# Patient Record
Sex: MALE | Race: WHITE | NOT HISPANIC OR LATINO | Employment: FULL TIME | URBAN - NONMETROPOLITAN AREA
[De-identification: names, ages, dates, MRNs, and addresses within clinical notes are randomized per-mention and may not be internally consistent; named-entity substitution may affect disease eponyms.]

---

## 2020-05-28 ENCOUNTER — OFFICE VISIT (OUTPATIENT)
Dept: SURGERY | Facility: CLINIC | Age: 45
End: 2020-05-28

## 2020-05-28 VITALS
HEIGHT: 70 IN | HEART RATE: 77 BPM | SYSTOLIC BLOOD PRESSURE: 117 MMHG | BODY MASS INDEX: 32.21 KG/M2 | WEIGHT: 225 LBS | DIASTOLIC BLOOD PRESSURE: 80 MMHG

## 2020-05-28 DIAGNOSIS — L02.214 CUTANEOUS ABSCESS OF GROIN: Primary | ICD-10-CM

## 2020-05-28 PROCEDURE — 99202 OFFICE O/P NEW SF 15 MIN: CPT | Performed by: SURGERY

## 2020-05-28 RX ORDER — LISINOPRIL 40 MG/1
TABLET ORAL
COMMUNITY
Start: 2020-03-10

## 2020-05-28 RX ORDER — ROPINIROLE 0.25 MG/1
TABLET, FILM COATED ORAL
COMMUNITY
Start: 2015-06-25 | End: 2021-03-16

## 2020-05-28 RX ORDER — INSULIN DEGLUDEC 200 U/ML
INJECTION, SOLUTION SUBCUTANEOUS
COMMUNITY
Start: 2020-05-27

## 2020-05-28 RX ORDER — GABAPENTIN 300 MG/1
CAPSULE ORAL
COMMUNITY
Start: 2015-09-21 | End: 2021-03-16

## 2020-05-28 RX ORDER — ALLOPURINOL 100 MG/1
TABLET ORAL
COMMUNITY
Start: 2020-05-10

## 2020-05-28 RX ORDER — SULFAMETHOXAZOLE AND TRIMETHOPRIM 800; 160 MG/1; MG/1
TABLET ORAL
COMMUNITY
Start: 2020-05-26 | End: 2021-03-16

## 2020-05-28 RX ORDER — ROSUVASTATIN CALCIUM 20 MG/1
TABLET, COATED ORAL
COMMUNITY
Start: 2020-05-10

## 2020-05-28 RX ORDER — PIOGLITAZONEHYDROCHLORIDE 45 MG/1
TABLET ORAL DAILY
COMMUNITY
Start: 2015-06-25 | End: 2021-03-16

## 2020-05-28 RX ORDER — ROSUVASTATIN CALCIUM 20 MG/1
TABLET, COATED ORAL DAILY
COMMUNITY
Start: 2015-06-25 | End: 2020-10-27

## 2020-05-28 RX ORDER — GEMFIBROZIL 600 MG/1
TABLET, FILM COATED ORAL
COMMUNITY
Start: 2020-05-26

## 2020-05-28 NOTE — PROGRESS NOTES
Subjective   Jhonny Gray is a 45 y.o. male.     Chief Complaint: abscess in pubic area    History of Present Illness He is a diabetic who has had inflamed areas come up in the pubic area over the last week or so. He has been on bactrim a couple of days. He had some in the perineal area years ago. No bug bites or  Injuries.     The following portions of the patient's history were reviewed and updated as appropriate: current medications, past family history, past medical history, past social history, past surgical history and problem list.    Review of Systems    Objective   Physical Exam 5 different indurated inflamed areas scattered across the pubic area. I/D done on 3 of them with local.     Assessment/Plan   Jhonny was seen today for groin abscess.    Diagnoses and all orders for this visit:    Cutaneous abscess of groin    continue bactrim, recheck next week. Clean and pack wounds.

## 2020-10-27 ENCOUNTER — OFFICE VISIT (OUTPATIENT)
Dept: FAMILY MEDICINE CLINIC | Facility: CLINIC | Age: 45
End: 2020-10-27

## 2020-10-27 ENCOUNTER — TELEPHONE (OUTPATIENT)
Dept: FAMILY MEDICINE CLINIC | Facility: CLINIC | Age: 45
End: 2020-10-27

## 2020-10-27 VITALS
SYSTOLIC BLOOD PRESSURE: 171 MMHG | WEIGHT: 236 LBS | TEMPERATURE: 98.2 F | RESPIRATION RATE: 18 BRPM | DIASTOLIC BLOOD PRESSURE: 91 MMHG | HEART RATE: 90 BPM | OXYGEN SATURATION: 97 % | BODY MASS INDEX: 33.79 KG/M2 | HEIGHT: 70 IN

## 2020-10-27 DIAGNOSIS — M67.911 TENDINOPATHY OF RIGHT SHOULDER: ICD-10-CM

## 2020-10-27 DIAGNOSIS — S49.91XD RIGHT SHOULDER INJURY, SUBSEQUENT ENCOUNTER: Primary | ICD-10-CM

## 2020-10-27 PROBLEM — R06.02 BREATH SHORTNESS: Status: ACTIVE | Noted: 2020-10-27

## 2020-10-27 PROBLEM — R07.9 CHEST PAIN: Status: ACTIVE | Noted: 2020-10-27

## 2020-10-27 PROBLEM — E11.42 DIABETIC PERIPHERAL NEUROPATHY (HCC): Status: ACTIVE | Noted: 2020-10-27

## 2020-10-27 PROBLEM — E78.5 DYSLIPIDEMIA: Status: ACTIVE | Noted: 2020-10-27

## 2020-10-27 PROBLEM — E11.9 DIABETES (HCC): Status: ACTIVE | Noted: 2020-10-27

## 2020-10-27 PROBLEM — I10 BP (HIGH BLOOD PRESSURE): Status: ACTIVE | Noted: 2020-10-27

## 2020-10-27 PROCEDURE — 99204 OFFICE O/P NEW MOD 45 MIN: CPT | Performed by: PSYCHOLOGIST

## 2020-10-27 RX ORDER — FLURBIPROFEN SODIUM 0.3 MG/ML
1 SOLUTION/ DROPS OPHTHALMIC DAILY
COMMUNITY
Start: 2020-08-06 | End: 2021-03-16

## 2020-10-27 RX ORDER — DICLOFENAC SODIUM 75 MG/1
75 TABLET, DELAYED RELEASE ORAL 2 TIMES DAILY
Qty: 28 TABLET | Refills: 1 | Status: SHIPPED | OUTPATIENT
Start: 2020-10-27 | End: 2020-11-10

## 2020-10-27 RX ORDER — GLYBURIDE-METFORMIN HYDROCHLORIDE 5; 500 MG/1; MG/1
5-500 TABLET ORAL 2 TIMES DAILY
COMMUNITY
Start: 2020-08-01 | End: 2020-10-27

## 2020-10-27 RX ORDER — CHLORAL HYDRATE 500 MG
1000 CAPSULE ORAL DAILY
COMMUNITY
Start: 2015-06-25

## 2020-10-27 RX ORDER — SEMAGLUTIDE 1.34 MG/ML
INJECTION, SOLUTION SUBCUTANEOUS WEEKLY
COMMUNITY

## 2020-10-27 RX ORDER — DICLOFENAC SODIUM 75 MG/1
75 TABLET, DELAYED RELEASE ORAL 2 TIMES DAILY
COMMUNITY
Start: 2020-08-03 | End: 2020-10-27 | Stop reason: SDUPTHER

## 2020-10-27 RX ORDER — ICOSAPENT ETHYL 1000 MG/1
1 CAPSULE ORAL DAILY
COMMUNITY
Start: 2020-08-20

## 2020-10-27 NOTE — PROGRESS NOTES
Subjective   Jhonny James is a 45 y.o. male who presents today for follow up for a WC visit. This is his 6 or 7th visit. Today. His PT sessions gave him improvement in ROM and he had his 13th visit with them and they advice ortho referral. He has been on LD since the injury. Denies any other c/o or new injury.     Chief Complaint   Patient presents with   • Shoulder Pain     Pt doing PT twice weekly       History of Present Illness     The following portions of the patient's history were reviewed and updated as appropriate: allergies, current medications, past family history, past medical history, past social history, past surgical history and problem list.    Past Medical History:  Past Medical History:   Diagnosis Date   • Hypertension    • Type 2 diabetes mellitus (CMS/HCC)        Social History:  Social History     Socioeconomic History   • Marital status:      Spouse name: Not on file   • Number of children: Not on file   • Years of education: Not on file   • Highest education level: Not on file   Tobacco Use   • Smoking status: Never Smoker   • Smokeless tobacco: Never Used   Substance and Sexual Activity   • Alcohol use: Not Currently   • Drug use: Not Currently   • Sexual activity: Not Currently       Family History:  Family History   Problem Relation Age of Onset   • Cancer Maternal Uncle    • Heart disease Maternal Grandmother    • Hypertension Maternal Grandfather    • Cancer Other    • Diabetes Other        Past Surgical History:  Past Surgical History:   Procedure Laterality Date   • GANGLION CYST EXCISION     • KNEE SURGERY     • MOUTH SURGERY     • TONSILLECTOMY         Problem List:  Patient Active Problem List   Diagnosis   • Cutaneous abscess of groin   • BP (high blood pressure)   • Breath shortness   • Chest pain   • Diabetes (CMS/HCC)   • Diabetic peripheral neuropathy (CMS/HCC)   • Dyslipidemia       Allergy:   No Known Allergies     Current Medications:   Current Outpatient  "Medications   Medication Sig Dispense Refill   • allopurinol (ZYLOPRIM) 100 MG tablet      • ASPIRIN 81 PO Take 81 mg by mouth Daily. TAKE WITH FOOD     • B-D UF III MINI PEN NEEDLES 31G X 5 MM misc Inject 1 dose under the skin into the appropriate area as directed Daily.     • diclofenac (VOLTAREN) 75 MG EC tablet Take 1 tablet by mouth 2 (two) times a day for 14 days. TAKE WITH FOOD 28 tablet 1   • gabapentin (NEURONTIN) 300 MG capsule Take  by mouth.     • gemfibrozil (LOPID) 600 MG tablet      • insulin detemir (Levemir FlexPen) 100 UNIT/ML injection Inject  under the skin into the appropriate area as directed.     • insulin detemir (Levemir) 100 UNIT/ML injection Inject  under the skin into the appropriate area as directed.     • Liraglutide (Victoza) 18 MG/3ML solution pen-injector injection Inject  under the skin into the appropriate area as directed.     • lisinopril (PRINIVIL,ZESTRIL) 40 MG tablet      • metFORMIN (GLUCOPHAGE) 1000 MG tablet Take  by mouth Every 12 (Twelve) Hours.     • Omega-3 Fatty Acids (fish oil) 1000 MG capsule capsule Take 1,000 mg by mouth Daily.     • pioglitazone (Actos) 45 MG tablet Take  by mouth Daily.     • rOPINIRole (Requip) 0.25 MG tablet Take  by mouth.     • rosuvastatin (CRESTOR) 20 MG tablet      • Semaglutide,0.25 or 0.5MG/DOS, (Ozempic, 0.25 or 0.5 MG/DOSE,) 2 MG/1.5ML solution pen-injector Inject  under the skin into the appropriate area as directed 1 (One) Time Per Week.     • sulfamethoxazole-trimethoprim (BACTRIM DS,SEPTRA DS) 800-160 MG per tablet      • TRESIBA FLEXTOUCH 200 UNIT/ML solution pen-injector pen injection      • Vascepa 1 g capsule capsule Take 1 g by mouth Daily.       No current facility-administered medications for this visit.        Objective     VITAL SIGNS:  /91 (BP Location: Right arm, Patient Position: Sitting, Cuff Size: Adult)   Pulse 90   Temp 98.2 °F (36.8 °C) (Temporal)   Resp 18   Ht 177.8 cm (70\")   Wt 107 kg (236 lb)   " SpO2 97%   BMI 33.86 kg/m²     Review of Symptoms:    Review of Systems   Constitutional: Negative for chills, fatigue and fever.   HENT: Negative for ear discharge, ear pain, postnasal drip, sore throat, swollen glands and trouble swallowing.    Eyes: Negative for discharge.   Respiratory: Negative for cough and shortness of breath.    Cardiovascular: Negative for chest pain.   Gastrointestinal: Negative for abdominal pain and vomiting.   Genitourinary: Negative for dysuria, frequency, urgency and urinary incontinence.   Musculoskeletal: Positive for joint swelling (right shoulder). Negative for back pain and neck pain.   Skin: Negative for rash.   Neurological: Negative for dizziness, seizures and weakness.   Hematological: Negative for adenopathy.   Psychiatric/Behavioral: Negative for depressed mood.       PHYSICAL EXAM:  Physical Exam  Vitals signs and nursing note reviewed.   Constitutional:       General: He is awake.      Appearance: Normal appearance. He is well-developed, well-groomed and normal weight.   HENT:      Head: Normocephalic and atraumatic.      Jaw: There is normal jaw occlusion.      Nose: Nose normal.      Mouth/Throat:      Mouth: Mucous membranes are dry.      Pharynx: Oropharynx is clear.   Eyes:      General: Lids are normal. Vision grossly intact. Gaze aligned appropriately.      Extraocular Movements: Extraocular movements intact.      Conjunctiva/sclera: Conjunctivae normal.      Pupils: Pupils are equal, round, and reactive to light.   Neck:      Musculoskeletal: Full passive range of motion without pain, normal range of motion and neck supple.      Trachea: Trachea and phonation normal.   Cardiovascular:      Rate and Rhythm: Normal rate and regular rhythm.      Pulses: Normal pulses.      Heart sounds: Normal heart sounds.   Pulmonary:      Effort: Pulmonary effort is normal.      Breath sounds: Normal breath sounds.   Abdominal:      General: Abdomen is flat. Bowel sounds are  normal.      Palpations: Abdomen is soft.   Genitourinary:     Rectum: Normal.   Musculoskeletal: Normal range of motion.      Right shoulder: He exhibits tenderness (to deep palp and good ROM but limited adduction and abduction) and crepitus.   Lymphadenopathy:      Cervical: No cervical adenopathy.   Skin:     General: Skin is warm and dry.      Capillary Refill: Capillary refill takes more than 3 seconds.   Neurological:      General: No focal deficit present.      Mental Status: He is alert and oriented to person, place, and time.      Cranial Nerves: Cranial nerves are intact.      Sensory: Sensation is intact.      Motor: Motor function is intact.      Coordination: Coordination is intact.      Gait: Gait is intact.      Deep Tendon Reflexes: Reflexes are normal and symmetric.   Psychiatric:         Attention and Perception: Attention and perception normal.         Mood and Affect: Mood and affect normal.         Speech: Speech normal.         Behavior: Behavior normal.         Thought Content: Thought content normal.         Cognition and Memory: Cognition and memory normal.         Judgment: Judgment normal.         Lab Results:   No visits with results within 1 Month(s) from this visit.   Latest known visit with results is:   No results found for any previous visit.       Assessment/Plan     Problem List Items Addressed This Visit     None      Visit Diagnoses     Right shoulder injury, subsequent encounter    -  Primary    Relevant Orders    Ambulatory Referral to Orthopedic Surgery    Tendinopathy of right shoulder        Relevant Orders    Ambulatory Referral to Orthopedic Surgery          PHQ-2/PHQ-9 Depression Screening 10/27/2020   Little interest or pleasure in doing things 0   Feeling down, depressed, or hopeless 0   Total Score 0       Patient's Body mass index is 33.86 kg/m². BMI is above normal parameters. Recommendations include: exercise counseling and nutrition counseling.   (Normal BMI:   18.5-24.9, OW 25-29.9, Obesity 30 or greater)    Jhonny James  reports that he has never smoked. He has never used smokeless tobacco.. I have educated him on the risk of diseases from using tobacco products such as cancer, COPD and heart disease.     The patient is gong to be referred to Ortho for further care and recommendation. Will inform patient of appointment. I will be releasing care to ortho. Continue LD at work until he sees ortho.    Visit Diagnoses:    ICD-10-CM ICD-9-CM   1. Right shoulder injury, subsequent encounter  S49.91XD V58.89     959.2   2. Tendinopathy of right shoulder  M67.911 727.9         MEDICATION ISSUES:  Discussed medication options and treatment plan of prescribed medication as well as the risks, benefits, and side effects including potential falls, possible impaired driving and metabolic adversities among others. Patient is agreeable to call the office with any worsening of symptoms or onset of side effects. Patient is agreeable to call 911 or go to the nearest ER should he/she begin having SI/HI.     MEDS ORDERED DURING VISIT:  New Medications Ordered This Visit   Medications   • diclofenac (VOLTAREN) 75 MG EC tablet     Sig: Take 1 tablet by mouth 2 (two) times a day for 14 days. TAKE WITH FOOD     Dispense:  28 tablet     Refill:  1       FOLLOW UP:   Return if symptoms worsen or fail to improve.           This document has been electronically signed by Venancio Ba MD   October 27, 2020 16:06 EDT    Part of this note may be an electronic transcription/translation of spoken language to printed text using the Dragon Dictation System.

## 2021-03-16 ENCOUNTER — OFFICE VISIT (OUTPATIENT)
Dept: CARDIOLOGY | Facility: CLINIC | Age: 46
End: 2021-03-16

## 2021-03-16 VITALS
HEIGHT: 71 IN | SYSTOLIC BLOOD PRESSURE: 123 MMHG | TEMPERATURE: 97.8 F | OXYGEN SATURATION: 98 % | WEIGHT: 229 LBS | BODY MASS INDEX: 32.06 KG/M2 | HEART RATE: 93 BPM | DIASTOLIC BLOOD PRESSURE: 76 MMHG

## 2021-03-16 DIAGNOSIS — I10 ESSENTIAL HYPERTENSION: Primary | ICD-10-CM

## 2021-03-16 DIAGNOSIS — I65.23 BILATERAL CAROTID ARTERY STENOSIS: ICD-10-CM

## 2021-03-16 DIAGNOSIS — R06.02 SHORTNESS OF BREATH: ICD-10-CM

## 2021-03-16 DIAGNOSIS — E78.2 MIXED HYPERLIPIDEMIA: ICD-10-CM

## 2021-03-16 DIAGNOSIS — Z82.49 FAMILY HISTORY OF EARLY CAD: ICD-10-CM

## 2021-03-16 PROBLEM — G51.0 BELL'S PALSY: Status: ACTIVE | Noted: 2021-03-16

## 2021-03-16 PROCEDURE — 99204 OFFICE O/P NEW MOD 45 MIN: CPT | Performed by: NURSE PRACTITIONER

## 2021-03-16 PROCEDURE — 93000 ELECTROCARDIOGRAM COMPLETE: CPT | Performed by: NURSE PRACTITIONER

## 2021-03-16 RX ORDER — GLYBURIDE 5 MG/1
5 TABLET ORAL 2 TIMES DAILY WITH MEALS
COMMUNITY

## 2021-03-16 NOTE — PROGRESS NOTES
Subjective   Jhonny James is a 46 y.o. male     Chief Complaint   Patient presents with   • Establish Care       HPI    Problem List:    1. Dyspnea  2. Hypertension  3. Dyslipidemia  4. Diabetes mellitus II; 20+ years  5. LATANYA - does not use CPAP   6.  Carotid artery disease  6.1 carotid artery ultrasound 3/3/2021-no findings to suggest hemodynamic significant obstructive atherosclerotic disease or stenosis in the internal carotid arteries bilaterally, antegrade flow both vertebral arteries, elevated velocities in the external carotid arteries bilaterally with mild amounts of regular luminal plaque seen  7.  Stress test 8/6/2015-no evidence of ischemia, post-rest EF 60%  8.  Family history of early CAD; dad CABG in 50s    Patient is a 46-year-old male who presents today to reestablish care.  He denies any chest pain, pressure, palpitations, fluttering, dizziness, presyncope, syncope, orthopnea, PND or edema.  He says that he does have some numbness in both hands.  Patient also indicated that he has shortness of breath if he overdoes it.  However if he sits for few minutes and he can get up and resume.  When he says overexerts he gave me an example of just pushing our exam table that he would become very short of breath and he would have to stop and rest.  He says he has learned what his limits are he just tries not to push himself beyond that point.  He says this is new for him but again he just really does not pay too much attention to it and just does not overdo it.  Patient had not really been taking his sugars serious and his most recent hemoglobin A1c was 13.5.  His triglycerides were 733 and his LDL was 121.  He has worked very hard himself in the past 3 weeks and the highest his sugar is gotten is 150.    Occupation: Parts sales  Patient quit smoking about 30 years ago but smoked for about 12 years a pack and 1/2 to 2 packs a day; socially drinks beer; no illicit drugs  12 ounce of diet Pepsi or Coke per  day; 2 cups of coffee; occasional tea    Mom late 60s -dissection of AAA, carotid artery aneurysm with repair, stroke  Dad 77 -CABG in his 50s, diabetes, permanent pacemaker, hypertension, hyperlipidemia    We went over labs from PCP.    Current Outpatient Medications on File Prior to Visit   Medication Sig Dispense Refill   • allopurinol (ZYLOPRIM) 100 MG tablet      • ASPIRIN 81 PO Take 81 mg by mouth 2 (two) times a day. TAKE WITH FOOD     • gemfibrozil (LOPID) 600 MG tablet      • glyburide (DIAbeta) 5 MG tablet Take 5 mg by mouth 2 (Two) Times a Day With Meals.     • lisinopril (PRINIVIL,ZESTRIL) 40 MG tablet      • metFORMIN (GLUCOPHAGE) 1000 MG tablet Take  by mouth Every 12 (Twelve) Hours.     • Omega-3 Fatty Acids (fish oil) 1000 MG capsule capsule Take 1,000 mg by mouth Daily.     • rosuvastatin (CRESTOR) 20 MG tablet      • Semaglutide,0.25 or 0.5MG/DOS, (Ozempic, 0.25 or 0.5 MG/DOSE,) 2 MG/1.5ML solution pen-injector Inject  under the skin into the appropriate area as directed 1 (One) Time Per Week.     • TRESIBA FLEXTOUCH 200 UNIT/ML solution pen-injector pen injection      • Vascepa 1 g capsule capsule Take 1 g by mouth Daily.     • [DISCONTINUED] B-D UF III MINI PEN NEEDLES 31G X 5 MM misc Inject 1 dose under the skin into the appropriate area as directed Daily.     • [DISCONTINUED] gabapentin (NEURONTIN) 300 MG capsule Take  by mouth.     • [DISCONTINUED] insulin detemir (Levemir FlexPen) 100 UNIT/ML injection Inject  under the skin into the appropriate area as directed.     • [DISCONTINUED] insulin detemir (Levemir) 100 UNIT/ML injection Inject  under the skin into the appropriate area as directed.     • [DISCONTINUED] Liraglutide (Victoza) 18 MG/3ML solution pen-injector injection Inject  under the skin into the appropriate area as directed.     • [DISCONTINUED] pioglitazone (Actos) 45 MG tablet Take  by mouth Daily.     • [DISCONTINUED] rOPINIRole (Requip) 0.25 MG tablet Take  by  mouth.     • [DISCONTINUED] sulfamethoxazole-trimethoprim (BACTRIM DS,SEPTRA DS) 800-160 MG per tablet        No current facility-administered medications on file prior to visit.       ALLERGIES    Patient has no known allergies.    Past Medical History:   Diagnosis Date   • Hypertension    • Type 2 diabetes mellitus (CMS/[facility])        Social History     Socioeconomic History   • Marital status:      Spouse name: Not on file   • Number of children: Not on file   • Years of education: Not on file   • Highest education level: Not on file   Tobacco Use   • Smoking status: Former Smoker     Packs/day: 1.50     Types: Cigarettes     Quit date: 3/16/2000     Years since quittin.0   • Smokeless tobacco: Former User     Quit date: 3/16/2015   Substance and Sexual Activity   • Alcohol use: Defer   • Drug use: Defer   • Sexual activity: Defer       Family History   Problem Relation Age of Onset   • Cancer Maternal Uncle    • Heart disease Maternal Grandmother    • Hypertension Maternal Grandfather    • Cancer Other    • Diabetes Other    • Hypertension Mother    • Sleep apnea Mother    • Aneurysm Mother    • Hypertension Father    • Diabetes Father        Review of Systems   Constitutional: Negative for appetite change, chills, fatigue and fever.   HENT: Negative for congestion, rhinorrhea and sore throat.    Eyes: Positive for visual disturbance (glasses).   Respiratory: Positive for shortness of breath (if he over does it and he has to sit for a few mins to get his breath; if he tried to push the exam table he will be really short of breath, he says give him 3-4 min and ready to go again; he just sets limits and does not push self to this point ). Negative for choking, chest tightness and wheezing.    Cardiovascular: Negative for chest pain, palpitations and leg swelling.   Gastrointestinal: Positive for diarrhea (only with certain medication ). Negative for abdominal pain, blood in stool, constipation, nausea and  "vomiting.   Endocrine: Positive for cold intolerance (more cooler ). Negative for heat intolerance.   Genitourinary: Negative for difficulty urinating, dysuria, frequency, hematuria and urgency.   Musculoskeletal: Positive for neck stiffness (he states his neck is never relaxes ). Negative for arthralgias, back pain, joint swelling and neck pain.   Skin: Negative for rash and wound.   Allergic/Immunologic: Positive for environmental allergies (just this year ). Negative for food allergies.   Neurological: Positive for weakness, numbness (both hands ) and headaches (in the back of his head; normal for him ). Negative for dizziness and light-headedness.   Hematological: Does not bruise/bleed easily.   Psychiatric/Behavioral: Negative for sleep disturbance.       Objective   /76 (BP Location: Right arm)   Pulse 93   Temp 97.8 °F (36.6 °C)   Ht 180.3 cm (71\")   Wt 104 kg (229 lb)   SpO2 98%   BMI 31.94 kg/m²   Vitals:    03/16/21 1420   BP: 123/76   BP Location: Right arm   Pulse: 93   Temp: 97.8 °F (36.6 °C)   SpO2: 98%   Weight: 104 kg (229 lb)   Height: 180.3 cm (71\")      Lab Results (most recent)     None        Physical Exam  Vitals reviewed.   Constitutional:       General: He is awake.      Appearance: Normal appearance. He is well-developed and well-groomed. He is obese.   HENT:      Head: Normocephalic.   Eyes:      General: Lids are normal.   Neck:      Vascular: No carotid bruit, hepatojugular reflux or JVD.   Cardiovascular:      Rate and Rhythm: Normal rate and regular rhythm.      Pulses:           Radial pulses are 2+ on the right side and 2+ on the left side.        Dorsalis pedis pulses are 2+ on the right side and 2+ on the left side.        Posterior tibial pulses are 2+ on the right side and 2+ on the left side.      Heart sounds: Normal heart sounds.   Pulmonary:      Effort: Pulmonary effort is normal.      Breath sounds: Normal breath sounds and air entry.   Abdominal:      General: " Bowel sounds are normal.      Palpations: Abdomen is soft.   Musculoskeletal:      Right lower leg: No edema.      Left lower leg: No edema.   Skin:     General: Skin is warm and dry.   Neurological:      Mental Status: He is alert and oriented to person, place, and time.   Psychiatric:         Attention and Perception: Attention and perception normal.         Mood and Affect: Mood and affect normal.         Speech: Speech normal.         Behavior: Behavior normal. Behavior is cooperative.         Thought Content: Thought content normal.         Cognition and Memory: Cognition and memory normal.         Judgment: Judgment normal.         Procedure     ECG 12 Lead    Date/Time: 3/16/2021 5:29 PM  Performed by: Jennifer Coronado APRN  Authorized by: Jennifer Coronado APRN   Comparison: compared with previous ECG from 12/12/2015  Comparison to previous ECG: Q wave III  Rhythm: sinus rhythm  Rate: normal  BPM: 88  Q waves: II    QRS axis: normal  Other findings: low voltage    Clinical impression: abnormal EKG                 Assessment/Plan      Diagnosis Plan   1. Essential hypertension  Treadmill Stress Test    Adult Transthoracic Echo Complete W/ Cont if Necessary Per Protocol   2. Mixed hyperlipidemia  Treadmill Stress Test   3. Bilateral carotid artery stenosis     4. Shortness of breath  Treadmill Stress Test    Adult Transthoracic Echo Complete W/ Cont if Necessary Per Protocol   5. Family history of early CAD  Treadmill Stress Test       Return in about 12 weeks (around 6/8/2021).    Hypertension/hyperlipidemia/shortness of breath/family history of early CAD-patient have an ischemia work-up, stress and echo.  He will continue his medication regimen for now.  He will follow-up in 12 weeks or sooner if any changes or abnormalities with testing.       Jhonny Erika  reports that he quit smoking about 21 years ago. His smoking use included cigarettes. He smoked 1.50 packs per day. He quit smokeless tobacco use  about 6 years ago..       Patient's Body mass index is 31.94 kg/m². BMI is above normal parameters. Recommendations include: educational material.      Electronically signed by:

## 2021-03-16 NOTE — PATIENT INSTRUCTIONS
Preventing High Cholesterol  Cholesterol is a white, waxy substance similar to fat that the human body needs to help build cells. The liver makes all the cholesterol that a person's body needs. Having high cholesterol (hypercholesterolemia) increases a person's risk for heart disease and stroke. Extra (excess) cholesterol comes from the food the person eats.  High cholesterol can often be prevented with diet and lifestyle changes. If you already have high cholesterol, you can control it with diet and lifestyle changes and with medicine.  How can high cholesterol affect me?  If you have high cholesterol, deposits (plaques) may build up on the walls of your arteries. The arteries are the blood vessels that carry blood away from your heart.  Plaques make the arteries narrower and stiffer. This can limit or block blood flow and cause blood clots to form. Blood clots:  · Are tiny balls of cells that form in your blood.  · Can move to the heart or brain, causing a heart attack or stroke.  Plaques in arteries greatly increase your risk for heart attack and stroke.Making diet and lifestyle changes can reduce your risk for these conditions that may threaten your life.  What can increase my risk?  This condition is more likely to develop in people who:  · Eat foods that are high in saturated fat or cholesterol. Saturated fat is mostly found in:  ? Foods that contain animal fat, such as red meat and some dairy products.  ? Certain fatty foods made from plants, such as tropical oils.  · Are overweight.  · Are not getting enough exercise.  · Have a family history of high cholesterol.  What actions can I take to prevent this?  Nutrition    · Eat less saturated fat.  · Avoid trans fats (partially hydrogenated oils). These are often found in margarine and in some baked goods, fried foods, and snacks bought in packages.  · Avoid precooked or cured meat, such as sausages or meat loaves.  · Avoid foods and drinks that have added  sugars.  · Eat more fruits, vegetables, and whole grains.  · Choose healthy sources of protein, such as fish, poultry, lean cuts of red meat, beans, peas, lentils, and nuts.  · Choose healthy sources of fat, such as:  ? Nuts.  ? Vegetable oils, especially olive oil.  ? Fish that have healthy fats (omega-3 fatty acids), such as mackerel or salmon.  The items listed above may not be a complete list of recommended foods and beverages. Contact a dietitian for more information.  Lifestyle  · Lose weight if you are overweight. Losing 5-10 lb (2.3-4.5 kg) can help prevent or control high cholesterol. It can also lower your risk for diabetes and high blood pressure. Ask your health care provider to help you with a diet and exercise plan to lose weight safely.  · Do not use any products that contain nicotine or tobacco, such as cigarettes, e-cigarettes, and chewing tobacco. If you need help quitting, ask your health care provider.  · Limit your alcohol intake.  ? Do not drink alcohol if:  § Your health care provider tells you not to drink.  § You are pregnant, may be pregnant, or are planning to become pregnant.  ? If you drink alcohol:  § Limit how much you use to:  § 0-1 drink a day for women.  § 0-2 drinks a day for men.  § Be aware of how much alcohol is in your drink. In the [place] one drink equals one 12 oz bottle of beer (355 mL), one 5 oz glass of wine (148 mL), or one 1½ oz glass of hard liquor (44 mL).  Activity    · Get enough exercise. Each week, do at least 150 minutes of exercise that takes a medium level of effort (moderate-intensity exercise).  ? This is exercise that:  § Makes your heart beat faster and makes you breathe harder than usual.  § Allows you to still be able to talk.  ? You could exercise in short sessions several times a day or longer sessions a few times a week. For example, on 5 days each week, you could walk fast or ride your bike 3 times a day for 10 minutes each time.  · Do exercises as told  by your health care provider.  Medicines  · In addition to diet and lifestyle changes, your health care provider may recommend medicines to help lower cholesterol. This may be a medicine to lower the amount of cholesterol your liver makes. You may need medicine if:  ? Diet and lifestyle changes do not lower your cholesterol enough.  ? You have high cholesterol and other risk factors for heart disease or stroke.  · Take over-the-counter and prescription medicines only as told by your health care provider.  General information  · Manage your risk factors for high cholesterol. Talk with your health care provider about all your risk factors and how to lower your risk.  · Manage other conditions that you have, such as diabetes or high blood pressure (hypertension).  · Have blood tests to check your cholesterol levels at regular points in time as told by your health care provider.  · Keep all follow-up visits as told by your health care provider. This is important.  Where to find more information  · American Heart Association: www.heart.org  · National Heart, Lung, and Blood [place]: www.nhlbi.nih.gov  Summary  · High cholesterol increases your risk for heart disease and stroke. By keeping your cholesterol level low, you can reduce your risk for these conditions.  · High cholesterol can often be prevented with diet and lifestyle changes.  · Work with your health care provider to manage your risk factors, and have your blood tested regularly.  This information is not intended to replace advice given to you by your health care provider. Make sure you discuss any questions you have with your health care provider.  Document Revised: 04/10/2020 Document Reviewed: 08/26/2017  [employer] Patient Education © 2020 [employer]    BMI for Adults  What is BMI?  Body mass index (BMI) is a number that is calculated from a person's weight and height. BMI can help estimate how much of a person's weight is composed of fat. BMI does not  "measure body fat directly. Rather, it is an alternative to procedures that directly measure body fat, which can be difficult and expensive.  BMI can help identify people who may be at higher risk for certain medical problems.  What are BMI measurements used for?  BMI is used as a screening tool to identify possible weight problems. It helps determine whether a person is obese, overweight, a healthy weight, or underweight.  BMI is useful for:  · Identifying a weight problem that may be related to a medical condition or may increase the risk for medical problems.  · Promoting changes, such as changes in diet and exercise, to help reach a healthy weight. BMI screening can be repeated to see if these changes are working.  How is BMI calculated?  BMI involves measuring your weight in relation to your height. Both height and weight are measured, and the BMI is calculated from those numbers. This can be done either in English ([place] or metric measurements. Note that charts and online BMI calculators are available to help you find your BMI quickly and easily without having to do these calculations yourself.  To calculate your BMI in English ([place] measurements:    1. Measure your weight in pounds (lb).  2. Multiply the number of pounds by 703.  ? For example, for a person who weighs 180 lb, multiply that number by 703, which equals 126,540.  3. Measure your height in inches. Then multiply that number by itself to get a measurement called \"inches squared.\"  ? For example, for a person who is 70 inches tall, the \"inches squared\" measurement is 70 inches x 70 inches, which equals 4,900 inches squared.  4. Divide the total from step 2 (number of lb x 703) by the total from step 3 (inches squared): 126,540 ÷ 4,900 = 25.8. This is your BMI.  To calculate your BMI in metric measurements:  1. Measure your weight in kilograms (kg).  2. Measure your height in meters (m). Then multiply that number by itself to get a measurement called " "\"meters squared.\"  ? For example, for a person who is 1.75 m tall, the \"meters squared\" measurement is 1.75 m x 1.75 m, which is equal to 3.1 meters squared.  3. Divide the number of kilograms (your weight) by the meters squared number. In this example: 70 ÷ 3.1 = 22.6. This is your BMI.  What do the results mean?  BMI charts are used to identify whether you are underweight, normal weight, overweight, or obese. The following guidelines will be used:  · Underweight: BMI less than 18.5.  · Normal weight: BMI between 18.5 and 24.9.  · Overweight: BMI between 25 and 29.9.  · Obese: BMI of 30 or above.  Keep these notes in mind:  · Weight includes both fat and muscle, so someone with a muscular build, such as an athlete, may have a BMI that is higher than 24.9. In cases like these, BMI is not an accurate measure of body fat.  · To determine if excess body fat is the cause of a BMI of 25 or higher, further assessments may need to be done by a health care provider.  · BMI is usually interpreted in the same way for men and women.  Where to find more information  For more information about BMI, including tools to quickly calculate your BMI, go to these websites:  · Centers for Disease Control and Prevention: www.cdc.gov  · American Heart Association: www.heart.org  · National Heart, Lung, and Blood [place]: www.nhlbi.nih.gov  Summary  · Body mass index (BMI) is a number that is calculated from a person's weight and height.  · BMI may help estimate how much of a person's weight is composed of fat. BMI can help identify those who may be at higher risk for certain medical problems.  · BMI can be measured using English measurements or metric measurements.  · BMI charts are used to identify whether you are underweight, normal weight, overweight, or obese.  This information is not intended to replace advice given to you by your health care provider. Make sure you discuss any questions you have with your health care " provider.  Document Revised: 09/09/2020 Document Reviewed: 07/17/2020  [employer] Patient Education © 2020 [employer]    Shortness of Breath, Adult  Shortness of breath is when a person has trouble breathing enough air or when a person feels like she or he is having trouble breathing in enough air. Shortness of breath could be a sign of a medical problem.  Follow these instructions at home:    · Pay attention to any changes in your symptoms.  · Do not use any products that contain nicotine or tobacco, such as cigarettes, e-cigarettes, and chewing tobacco.  · Do not smoke. Smoking is a common cause of shortness of breath. If you need help quitting, ask your health care provider.  · Avoid things that can irritate your airways, such as:  ? Mold.  ? Dust.  ? Air pollution.  ? Chemical fumes.  ? Things that can cause allergy symptoms (allergens), if you have allergies.  · Keep your living space clean and free of mold and dust.  · Rest as needed. Slowly return to your usual activities.  · Take over-the-counter and prescription medicines only as told by your health care provider. This includes oxygen therapy and inhaled medicines.  · Keep all follow-up visits as told by your health care provider. This is important.  Contact a health care provider if:  · Your condition does not improve as soon as expected.  · You have a hard time doing your normal activities, even after you rest.  · You have new symptoms.  Get help right away if:  · Your shortness of breath gets worse.  · You have shortness of breath when you are resting.  · You feel light-headed or you faint.  · You have a cough that is not controlled with medicines.  · You cough up blood.  · You have pain with breathing.  · You have pain in your chest, arms, shoulders, or abdomen.  · You have a fever.  · You cannot walk up stairs or exercise the way that you normally do.  These symptoms may represent a serious problem that is an emergency. Do not wait to see if the  symptoms will go away. Get medical help right away. Call your local emergency services (911 in the [place] Do not drive yourself to the hospital.  Summary  · Shortness of breath is when a person has trouble breathing enough air. It can be a sign of a medical problem.  · Avoid things that irritate your lungs, such as smoking, pollution, mold, and dust.  · Pay attention to changes in your symptoms and contact your health care provider if you have a hard time completing daily activities because of shortness of breath.  This information is not intended to replace advice given to you by your health care provider. Make sure you discuss any questions you have with your health care provider.  Document Revised: 05/20/2019 Document Reviewed: 05/20/2019  [employer] Patient Education © 2020 [employer]

## 2021-03-17 PROBLEM — Z82.49 FAMILY HISTORY OF EARLY CAD: Status: ACTIVE | Noted: 2021-03-17

## 2021-03-23 ENCOUNTER — BULK ORDERING (OUTPATIENT)
Dept: CASE MANAGEMENT | Facility: OTHER | Age: 46
End: 2021-03-23

## 2021-03-23 DIAGNOSIS — Z23 IMMUNIZATION DUE: ICD-10-CM

## 2021-05-07 ENCOUNTER — APPOINTMENT (OUTPATIENT)
Dept: CARDIOLOGY | Facility: HOSPITAL | Age: 46
End: 2021-05-07

## 2021-06-23 ENCOUNTER — HOSPITAL ENCOUNTER (OUTPATIENT)
Dept: HOSPITAL 79 - EMI | Age: 46
End: 2021-06-23
Attending: PSYCHIATRY & NEUROLOGY
Payer: COMMERCIAL

## 2021-06-23 DIAGNOSIS — G12.20: ICD-10-CM

## 2021-06-23 DIAGNOSIS — M25.78: ICD-10-CM

## 2021-06-23 DIAGNOSIS — G95.9: ICD-10-CM

## 2021-06-23 DIAGNOSIS — G54.0: ICD-10-CM

## 2021-06-23 DIAGNOSIS — G56.03: ICD-10-CM

## 2021-06-23 DIAGNOSIS — R20.2: Primary | ICD-10-CM

## 2021-06-23 DIAGNOSIS — M48.02: ICD-10-CM
